# Patient Record
Sex: FEMALE | Race: OTHER | NOT HISPANIC OR LATINO | URBAN - METROPOLITAN AREA
[De-identification: names, ages, dates, MRNs, and addresses within clinical notes are randomized per-mention and may not be internally consistent; named-entity substitution may affect disease eponyms.]

---

## 2019-05-23 ENCOUNTER — EMERGENCY (EMERGENCY)
Facility: HOSPITAL | Age: 44
LOS: 1 days | Discharge: HOME | End: 2019-05-23
Attending: EMERGENCY MEDICINE
Payer: COMMERCIAL

## 2019-05-23 VITALS
RESPIRATION RATE: 18 BRPM | OXYGEN SATURATION: 99 % | HEART RATE: 88 BPM | SYSTOLIC BLOOD PRESSURE: 122 MMHG | TEMPERATURE: 98 F

## 2019-05-23 VITALS
WEIGHT: 128.97 LBS | OXYGEN SATURATION: 100 % | DIASTOLIC BLOOD PRESSURE: 79 MMHG | HEART RATE: 95 BPM | TEMPERATURE: 98 F | RESPIRATION RATE: 18 BRPM | HEIGHT: 66 IN | SYSTOLIC BLOOD PRESSURE: 124 MMHG

## 2019-05-23 LAB
ALBUMIN SERPL ELPH-MCNC: 4.3 G/DL — SIGNIFICANT CHANGE UP (ref 3.3–5)
ALP SERPL-CCNC: 45 U/L — SIGNIFICANT CHANGE UP (ref 40–120)
ALT FLD-CCNC: 12 U/L — SIGNIFICANT CHANGE UP (ref 10–45)
ANION GAP SERPL CALC-SCNC: 12 MMOL/L — SIGNIFICANT CHANGE UP (ref 5–17)
APPEARANCE UR: CLEAR — SIGNIFICANT CHANGE UP
AST SERPL-CCNC: 12 U/L — SIGNIFICANT CHANGE UP (ref 10–40)
BACTERIA # UR AUTO: NEGATIVE — SIGNIFICANT CHANGE UP
BASOPHILS # BLD AUTO: 0.1 K/UL — SIGNIFICANT CHANGE UP (ref 0–0.2)
BASOPHILS NFR BLD AUTO: 1.6 % — SIGNIFICANT CHANGE UP (ref 0–2)
BILIRUB SERPL-MCNC: 0.6 MG/DL — SIGNIFICANT CHANGE UP (ref 0.2–1.2)
BILIRUB UR-MCNC: NEGATIVE — SIGNIFICANT CHANGE UP
BUN SERPL-MCNC: 6 MG/DL — LOW (ref 7–23)
CALCIUM SERPL-MCNC: 9.8 MG/DL — SIGNIFICANT CHANGE UP (ref 8.4–10.5)
CHLORIDE SERPL-SCNC: 103 MMOL/L — SIGNIFICANT CHANGE UP (ref 96–108)
CO2 SERPL-SCNC: 26 MMOL/L — SIGNIFICANT CHANGE UP (ref 22–31)
COLOR SPEC: COLORLESS — SIGNIFICANT CHANGE UP
CREAT SERPL-MCNC: 0.57 MG/DL — SIGNIFICANT CHANGE UP (ref 0.5–1.3)
DIFF PNL FLD: NEGATIVE — SIGNIFICANT CHANGE UP
EOSINOPHIL # BLD AUTO: 0.1 K/UL — SIGNIFICANT CHANGE UP (ref 0–0.5)
EOSINOPHIL NFR BLD AUTO: 1.4 % — SIGNIFICANT CHANGE UP (ref 0–6)
EPI CELLS # UR: 0 /HPF — SIGNIFICANT CHANGE UP
GLUCOSE SERPL-MCNC: 116 MG/DL — HIGH (ref 70–99)
GLUCOSE UR QL: NEGATIVE — SIGNIFICANT CHANGE UP
HCG UR QL: NEGATIVE — SIGNIFICANT CHANGE UP
HCT VFR BLD CALC: 37.6 % — SIGNIFICANT CHANGE UP (ref 34.5–45)
HGB BLD-MCNC: 12.7 G/DL — SIGNIFICANT CHANGE UP (ref 11.5–15.5)
HYALINE CASTS # UR AUTO: 0 /LPF — SIGNIFICANT CHANGE UP (ref 0–2)
KETONES UR-MCNC: SIGNIFICANT CHANGE UP
LEUKOCYTE ESTERASE UR-ACNC: NEGATIVE — SIGNIFICANT CHANGE UP
LIDOCAIN IGE QN: 31 U/L — SIGNIFICANT CHANGE UP (ref 7–60)
LYMPHOCYTES # BLD AUTO: 2.2 K/UL — SIGNIFICANT CHANGE UP (ref 1–3.3)
LYMPHOCYTES # BLD AUTO: 41.8 % — SIGNIFICANT CHANGE UP (ref 13–44)
MCHC RBC-ENTMCNC: 33.7 GM/DL — SIGNIFICANT CHANGE UP (ref 32–36)
MCHC RBC-ENTMCNC: 33.9 PG — SIGNIFICANT CHANGE UP (ref 27–34)
MCV RBC AUTO: 101 FL — HIGH (ref 80–100)
MONOCYTES # BLD AUTO: 0.4 K/UL — SIGNIFICANT CHANGE UP (ref 0–0.9)
MONOCYTES NFR BLD AUTO: 7 % — SIGNIFICANT CHANGE UP (ref 2–14)
NEUTROPHILS # BLD AUTO: 2.5 K/UL — SIGNIFICANT CHANGE UP (ref 1.8–7.4)
NEUTROPHILS NFR BLD AUTO: 48.2 % — SIGNIFICANT CHANGE UP (ref 43–77)
NITRITE UR-MCNC: NEGATIVE — SIGNIFICANT CHANGE UP
PH UR: 7.5 — SIGNIFICANT CHANGE UP (ref 5–8)
PLATELET # BLD AUTO: 235 K/UL — SIGNIFICANT CHANGE UP (ref 150–400)
POTASSIUM SERPL-MCNC: 4.2 MMOL/L — SIGNIFICANT CHANGE UP (ref 3.5–5.3)
POTASSIUM SERPL-SCNC: 4.2 MMOL/L — SIGNIFICANT CHANGE UP (ref 3.5–5.3)
PROT SERPL-MCNC: 6.9 G/DL — SIGNIFICANT CHANGE UP (ref 6–8.3)
PROT UR-MCNC: NEGATIVE — SIGNIFICANT CHANGE UP
RBC # BLD: 3.74 M/UL — LOW (ref 3.8–5.2)
RBC # FLD: 11.7 % — SIGNIFICANT CHANGE UP (ref 10.3–14.5)
RBC CASTS # UR COMP ASSIST: 0 /HPF — SIGNIFICANT CHANGE UP (ref 0–4)
SODIUM SERPL-SCNC: 141 MMOL/L — SIGNIFICANT CHANGE UP (ref 135–145)
SP GR SPEC: 1 — LOW (ref 1.01–1.02)
UROBILINOGEN FLD QL: NEGATIVE — SIGNIFICANT CHANGE UP
WBC # BLD: 5.2 K/UL — SIGNIFICANT CHANGE UP (ref 3.8–10.5)
WBC # FLD AUTO: 5.2 K/UL — SIGNIFICANT CHANGE UP (ref 3.8–10.5)
WBC UR QL: 0 /HPF — SIGNIFICANT CHANGE UP (ref 0–5)

## 2019-05-23 PROCEDURE — 80053 COMPREHEN METABOLIC PANEL: CPT

## 2019-05-23 PROCEDURE — 74177 CT ABD & PELVIS W/CONTRAST: CPT | Mod: 26

## 2019-05-23 PROCEDURE — 96361 HYDRATE IV INFUSION ADD-ON: CPT

## 2019-05-23 PROCEDURE — 96375 TX/PRO/DX INJ NEW DRUG ADDON: CPT

## 2019-05-23 PROCEDURE — 74177 CT ABD & PELVIS W/CONTRAST: CPT

## 2019-05-23 PROCEDURE — 96374 THER/PROPH/DIAG INJ IV PUSH: CPT | Mod: XU

## 2019-05-23 PROCEDURE — 99284 EMERGENCY DEPT VISIT MOD MDM: CPT | Mod: 25

## 2019-05-23 PROCEDURE — 99284 EMERGENCY DEPT VISIT MOD MDM: CPT

## 2019-05-23 PROCEDURE — 81025 URINE PREGNANCY TEST: CPT

## 2019-05-23 PROCEDURE — 83690 ASSAY OF LIPASE: CPT

## 2019-05-23 PROCEDURE — 81001 URINALYSIS AUTO W/SCOPE: CPT

## 2019-05-23 PROCEDURE — 85027 COMPLETE CBC AUTOMATED: CPT

## 2019-05-23 RX ORDER — KETOROLAC TROMETHAMINE 30 MG/ML
15 SYRINGE (ML) INJECTION ONCE
Refills: 0 | Status: DISCONTINUED | OUTPATIENT
Start: 2019-05-23 | End: 2019-05-23

## 2019-05-23 RX ORDER — FAMOTIDINE 10 MG/ML
20 INJECTION INTRAVENOUS ONCE
Refills: 0 | Status: COMPLETED | OUTPATIENT
Start: 2019-05-23 | End: 2019-05-23

## 2019-05-23 RX ORDER — ACETAMINOPHEN 500 MG
975 TABLET ORAL ONCE
Refills: 0 | Status: COMPLETED | OUTPATIENT
Start: 2019-05-23 | End: 2019-05-23

## 2019-05-23 RX ORDER — ONDANSETRON 8 MG/1
4 TABLET, FILM COATED ORAL ONCE
Refills: 0 | Status: COMPLETED | OUTPATIENT
Start: 2019-05-23 | End: 2019-05-23

## 2019-05-23 RX ORDER — SODIUM CHLORIDE 9 MG/ML
1000 INJECTION INTRAMUSCULAR; INTRAVENOUS; SUBCUTANEOUS ONCE
Refills: 0 | Status: COMPLETED | OUTPATIENT
Start: 2019-05-23 | End: 2019-05-23

## 2019-05-23 RX ADMIN — FAMOTIDINE 20 MILLIGRAM(S): 10 INJECTION INTRAVENOUS at 20:21

## 2019-05-23 RX ADMIN — Medication 15 MILLIGRAM(S): at 20:23

## 2019-05-23 RX ADMIN — ONDANSETRON 4 MILLIGRAM(S): 8 TABLET, FILM COATED ORAL at 18:34

## 2019-05-23 RX ADMIN — Medication 975 MILLIGRAM(S): at 20:23

## 2019-05-23 RX ADMIN — SODIUM CHLORIDE 1000 MILLILITER(S): 9 INJECTION INTRAMUSCULAR; INTRAVENOUS; SUBCUTANEOUS at 18:30

## 2019-05-23 RX ADMIN — Medication 15 MILLIGRAM(S): at 18:34

## 2019-05-23 RX ADMIN — SODIUM CHLORIDE 1000 MILLILITER(S): 9 INJECTION INTRAMUSCULAR; INTRAVENOUS; SUBCUTANEOUS at 20:23

## 2019-05-23 RX ADMIN — Medication 975 MILLIGRAM(S): at 18:32

## 2019-05-23 NOTE — ED PROVIDER NOTE - CLINICAL SUMMARY MEDICAL DECISION MAKING FREE TEXT BOX
Maury WILLETT MD PGY1: 43 F p/w persistent generalized abd pain x 1 week with diffuse TTP c/f pancreatitis vs appendicitis vs other causes of acute abdomen. Will obtain CTAP to evaluate. WIll symptomatically treat for now.

## 2019-05-23 NOTE — ED PROVIDER NOTE - PHYSICAL EXAMINATION
Maury WILLETT MD PGY1:   PHYSICAL EXAM:    GENERAL: Uncomfortable  HEENT:  Atraumatic, Normocephalic  CHEST/LUNG: Chest rise equal bilaterally  HEART: Regular rate and rhythm  ABDOMEN: Diffusely TTP maximally LUQ  EXTREMITIES:  2+ Peripheral Pulses.  PSYCH: A&Ox3  SKIN: No obvious rashes or lesions Maury WILLETT MD PGY1:   PHYSICAL EXAM:    GENERAL: Uncomfortable  HEENT:  Atraumatic, Normocephalic  CHEST/LUNG: Chest rise equal bilaterally  HEART: Regular rate and rhythm  ABDOMEN: Diffusely TTP maximally LUQ  : No vaginal discharge or adnexal tenderness.   EXTREMITIES:  2+ Peripheral Pulses.  PSYCH: A&Ox3  SKIN: No obvious rashes or lesions

## 2019-05-23 NOTE — ED PROVIDER NOTE - NSFOLLOWUPCLINICS_GEN_ALL_ED_FT
[facility]  Gastroenterology  [address] [place] [address] [address]  [place] [address]  Phone: [phone]  Fax:   Follow Up Time:     [facility]  Gastroenterology  [address] [place] [address]  Phone: [phone]  Fax: [phone]  Follow Up Time:

## 2019-05-23 NOTE — ED ADULT NURSE NOTE - NSIMPLEMENTINTERV_GEN_ALL_ED
Implemented All Universal Safety Interventions:  [place] to call system. Call bell, personal items and telephone within reach. Instruct patient to call for assistance. Room bathroom lighting operational. Non-slip footwear when patient is off stretcher. Physically safe environment: no spills, clutter or unnecessary equipment. Stretcher in lowest position, wheels locked, appropriate side rails in place.

## 2019-05-23 NOTE — ED PROVIDER NOTE - OBJECTIVE STATEMENT
Maury WILLETT MD PGY1: 43 F PMH GERD now resolved and chronic candidiasis p/w generalized abdominal pain worsening x 1 week originating from LUQ initially associated with poor PO intake, nausea, no emesis, and normal BM. Only abd surg was C section but passing regular BM and flatus. No fevers, subjecive chills. No recent sickness. No other assoc sxs.

## 2019-05-23 NOTE — ED PROVIDER NOTE - PROGRESS NOTE DETAILS
Maury WILLETT MD PGY1: Patient's sxs resolved. Ate a full meal and tolerated it for > 1 hour now. No abdominal pain on abdominal exam. Patient encouragedd to follow-up with a gastroenterologist and return precautions given.

## 2019-05-23 NOTE — ED PROVIDER NOTE - NSFOLLOWUPINSTRUCTIONS_ED_ALL_ED_FT
Please return to the ED for any concerns. Please follow-up with a gastroenterologist in the next week.

## 2019-05-23 NOTE — ED ADULT NURSE NOTE - OBJECTIVE STATEMENT
44 y/o female came with   with history of chronic candidiasis, pt c/o generalized abdominal pain worsening x 1 week that started from LUQ.  Pt also reports poor PO intake, nausea, normal BM but no vomiting.  No fevers,  no chills, no recent sickness, no chest pain.  Respiration easy and non labored.  Extremities mobile.

## 2019-05-25 ENCOUNTER — EMERGENCY (EMERGENCY)
Facility: HOSPITAL | Age: 44
LOS: 1 days | Discharge: HOME | End: 2019-05-25
Attending: STUDENT IN AN ORGANIZED HEALTH CARE EDUCATION/TRAINING PROGRAM
Payer: COMMERCIAL

## 2019-05-25 VITALS
DIASTOLIC BLOOD PRESSURE: 83 MMHG | WEIGHT: 128.09 LBS | HEART RATE: 76 BPM | SYSTOLIC BLOOD PRESSURE: 123 MMHG | HEIGHT: 66 IN | RESPIRATION RATE: 16 BRPM | TEMPERATURE: 98 F | OXYGEN SATURATION: 99 %

## 2019-05-25 PROCEDURE — 99284 EMERGENCY DEPT VISIT MOD MDM: CPT

## 2019-05-26 VITALS
TEMPERATURE: 98 F | OXYGEN SATURATION: 97 % | RESPIRATION RATE: 16 BRPM | DIASTOLIC BLOOD PRESSURE: 85 MMHG | SYSTOLIC BLOOD PRESSURE: 125 MMHG | HEART RATE: 65 BPM

## 2019-05-26 PROBLEM — K21.9 GASTRO-ESOPHAGEAL REFLUX DISEASE WITHOUT ESOPHAGITIS: Chronic | Status: ACTIVE | Noted: 2019-05-23

## 2019-05-26 LAB
ALBUMIN SERPL ELPH-MCNC: 4.4 G/DL — SIGNIFICANT CHANGE UP (ref 3.3–5)
ALP SERPL-CCNC: 41 U/L — SIGNIFICANT CHANGE UP (ref 40–120)
ALT FLD-CCNC: 13 U/L — SIGNIFICANT CHANGE UP (ref 10–45)
ANION GAP SERPL CALC-SCNC: 13 MMOL/L — SIGNIFICANT CHANGE UP (ref 5–17)
APPEARANCE UR: CLEAR — SIGNIFICANT CHANGE UP
AST SERPL-CCNC: 14 U/L — SIGNIFICANT CHANGE UP (ref 10–40)
BACTERIA # UR AUTO: NEGATIVE — SIGNIFICANT CHANGE UP
BASOPHILS # BLD AUTO: 0.1 K/UL — SIGNIFICANT CHANGE UP (ref 0–0.2)
BASOPHILS NFR BLD AUTO: 0.9 % — SIGNIFICANT CHANGE UP (ref 0–2)
BILIRUB SERPL-MCNC: 0.8 MG/DL — SIGNIFICANT CHANGE UP (ref 0.2–1.2)
BILIRUB UR-MCNC: NEGATIVE — SIGNIFICANT CHANGE UP
BUN SERPL-MCNC: 4 MG/DL — LOW (ref 7–23)
CALCIUM SERPL-MCNC: 9.6 MG/DL — SIGNIFICANT CHANGE UP (ref 8.4–10.5)
CHLORIDE SERPL-SCNC: 98 MMOL/L — SIGNIFICANT CHANGE UP (ref 96–108)
CO2 SERPL-SCNC: 25 MMOL/L — SIGNIFICANT CHANGE UP (ref 22–31)
COLOR SPEC: YELLOW — SIGNIFICANT CHANGE UP
CREAT SERPL-MCNC: 0.55 MG/DL — SIGNIFICANT CHANGE UP (ref 0.5–1.3)
DIFF PNL FLD: NEGATIVE — SIGNIFICANT CHANGE UP
EOSINOPHIL # BLD AUTO: 0.2 K/UL — SIGNIFICANT CHANGE UP (ref 0–0.5)
EOSINOPHIL NFR BLD AUTO: 2.6 % — SIGNIFICANT CHANGE UP (ref 0–6)
EPI CELLS # UR: 0 /HPF — SIGNIFICANT CHANGE UP
GLUCOSE SERPL-MCNC: 102 MG/DL — HIGH (ref 70–99)
GLUCOSE UR QL: NEGATIVE — SIGNIFICANT CHANGE UP
HCT VFR BLD CALC: 35.4 % — SIGNIFICANT CHANGE UP (ref 34.5–45)
HGB BLD-MCNC: 11.9 G/DL — SIGNIFICANT CHANGE UP (ref 11.5–15.5)
HYALINE CASTS # UR AUTO: 0 /LPF — SIGNIFICANT CHANGE UP (ref 0–2)
KETONES UR-MCNC: NEGATIVE — SIGNIFICANT CHANGE UP
LEUKOCYTE ESTERASE UR-ACNC: NEGATIVE — SIGNIFICANT CHANGE UP
LIDOCAIN IGE QN: 36 U/L — SIGNIFICANT CHANGE UP (ref 7–60)
LYMPHOCYTES # BLD AUTO: 2.8 K/UL — SIGNIFICANT CHANGE UP (ref 1–3.3)
LYMPHOCYTES # BLD AUTO: 47.5 % — HIGH (ref 13–44)
MCHC RBC-ENTMCNC: 33.7 GM/DL — SIGNIFICANT CHANGE UP (ref 32–36)
MCHC RBC-ENTMCNC: 34 PG — SIGNIFICANT CHANGE UP (ref 27–34)
MCV RBC AUTO: 101 FL — HIGH (ref 80–100)
MONOCYTES # BLD AUTO: 0.4 K/UL — SIGNIFICANT CHANGE UP (ref 0–0.9)
MONOCYTES NFR BLD AUTO: 7 % — SIGNIFICANT CHANGE UP (ref 2–14)
NEUTROPHILS # BLD AUTO: 2.5 K/UL — SIGNIFICANT CHANGE UP (ref 1.8–7.4)
NEUTROPHILS NFR BLD AUTO: 42 % — LOW (ref 43–77)
NITRITE UR-MCNC: NEGATIVE — SIGNIFICANT CHANGE UP
PH UR: 6.5 — SIGNIFICANT CHANGE UP (ref 5–8)
PLATELET # BLD AUTO: 213 K/UL — SIGNIFICANT CHANGE UP (ref 150–400)
POTASSIUM SERPL-MCNC: 3.9 MMOL/L — SIGNIFICANT CHANGE UP (ref 3.5–5.3)
POTASSIUM SERPL-SCNC: 3.9 MMOL/L — SIGNIFICANT CHANGE UP (ref 3.5–5.3)
PROT SERPL-MCNC: 6.8 G/DL — SIGNIFICANT CHANGE UP (ref 6–8.3)
PROT UR-MCNC: NEGATIVE — SIGNIFICANT CHANGE UP
RBC # BLD: 3.5 M/UL — LOW (ref 3.8–5.2)
RBC # FLD: 11.6 % — SIGNIFICANT CHANGE UP (ref 10.3–14.5)
RBC CASTS # UR COMP ASSIST: 1 /HPF — SIGNIFICANT CHANGE UP (ref 0–4)
SODIUM SERPL-SCNC: 136 MMOL/L — SIGNIFICANT CHANGE UP (ref 135–145)
SP GR SPEC: 1 — LOW (ref 1.01–1.02)
UROBILINOGEN FLD QL: NEGATIVE — SIGNIFICANT CHANGE UP
WBC # BLD: 5.9 K/UL — SIGNIFICANT CHANGE UP (ref 3.8–10.5)
WBC # FLD AUTO: 5.9 K/UL — SIGNIFICANT CHANGE UP (ref 3.8–10.5)
WBC UR QL: 0 /HPF — SIGNIFICANT CHANGE UP (ref 0–5)

## 2019-05-26 PROCEDURE — 83690 ASSAY OF LIPASE: CPT

## 2019-05-26 PROCEDURE — 87086 URINE CULTURE/COLONY COUNT: CPT

## 2019-05-26 PROCEDURE — 80053 COMPREHEN METABOLIC PANEL: CPT

## 2019-05-26 PROCEDURE — 96374 THER/PROPH/DIAG INJ IV PUSH: CPT

## 2019-05-26 PROCEDURE — 99284 EMERGENCY DEPT VISIT MOD MDM: CPT | Mod: 25

## 2019-05-26 PROCEDURE — 85027 COMPLETE CBC AUTOMATED: CPT

## 2019-05-26 PROCEDURE — 81001 URINALYSIS AUTO W/SCOPE: CPT

## 2019-05-26 RX ORDER — FAMOTIDINE 10 MG/ML
20 INJECTION INTRAVENOUS ONCE
Refills: 0 | Status: COMPLETED | OUTPATIENT
Start: 2019-05-26 | End: 2019-05-26

## 2019-05-26 RX ORDER — PHENAZOPYRIDINE HCL 100 MG
200 TABLET ORAL ONCE
Refills: 0 | Status: DISCONTINUED | OUTPATIENT
Start: 2019-05-26 | End: 2019-05-29

## 2019-05-26 RX ADMIN — FAMOTIDINE 20 MILLIGRAM(S): 10 INJECTION INTRAVENOUS at 01:26

## 2019-05-26 RX ADMIN — Medication 30 MILLILITER(S): at 01:26

## 2019-05-26 NOTE — ED PROVIDER NOTE - ATTENDING CONTRIBUTION TO CARE
suprapubic abd pain and dysuria. no vag bleeding/discharge  mild imoprovement with home squeezed cranberry juice  no fever chills, cp, sob n/v back pain rash  lmp 2 weeks ago    nad ncat mmm  s1s2 rrr  lungs cta  abd soft ntnd  mild suprapubic ttp    labs without significant abnormality suprapubic abd pain and dysuria. no vag bleeding/discharge  mild imoprovement with home squeezed cranberry juice  no fever chills, cp, sob n/v back pain rash  lmp 2 weeks ago  seen by gyn recently and reports normal exam    nad ncat mmm  s1s2 rrr  lungs cta  abd soft ntnd  mild suprapubic ttp  pt deferring gu exam  labs without significant abnormality. no uti  doubt surgical pathology  will f/u with pcp tues  pyridium prn 44 yo F pmh gerd with suprapubic abd pain and dysuria. no vag bleeding/discharge  mild improvement with home squeezed cranberry juice  no fever chills, cp, sob n/v back pain rash  lmp 2 weeks ago  seen by gyn recently and reports normal exam. labs/ct from recent visit reviewed.    nad ncat mmm  s1s2 rrr  lungs cta  abd soft ntnd, no rebound or guarding  mild suprapubic ttp, no cva ttp  pt declining/deferring gu exam in ED  labs without significant abnormality. no uti  doubt surgical pathology  will f/u with pcp tuesday  will dc with pyridium prn  Return precautions were discussed with patient at bedside and patient expressed understanding.

## 2019-05-26 NOTE — ED PROVIDER NOTE - PHYSICAL EXAMINATION
Maury WILLETT MD PGY1:   PHYSICAL EXAM:    GENERAL: NAD, well-developed  HEENT:  Atraumatic, Normocephalic  CHEST/LUNG: Chest rise equal bilaterally  HEART: Regular rate and rhythm  ABDOMEN: Soft, Nontender, Nondistended   : Patient deferring vaginal exam for now as she is "99% sure" that it isnt involved.   EXTREMITIES:  2+ Peripheral Pulses.  PSYCH: A&Ox3  SKIN: No obvious rashes or lesions

## 2019-05-26 NOTE — ED PROVIDER NOTE - NSFOLLOWUPCLINICS_GEN_ALL_ED_FT
[facility] - Primary Care  Primary Care  [address] [place] [address]  Phone: [phone]  Fax:   Follow Up Time:

## 2019-05-26 NOTE — ED PROVIDER NOTE - CLINICAL SUMMARY MEDICAL DECISION MAKING FREE TEXT BOX
Maury WILLETT MD PGY1: 43 F no cont PMH p/w dysuria and suprapubic pressure x 2 days with negative UA and deferring vaginal exam. Joint decision making with patient. Patient comfortable following up with her OBGYN on Tuesday with premade appointment and return to the ED if febrile, chills, any concerns.

## 2019-05-26 NOTE — ED ADULT NURSE NOTE - OBJECTIVE STATEMENT
44y/o female presented to the ED from home with complaint of abdominal pain. A&Ox3, ambulatory. Patient reports lower abdominal pain x2 days associated with dysuria and suprapubic pressure. Patient states that she was seen here 2 days ago for similar symptoms. Patient states that she has been taking cranberry supplements and drinking cranberry juice an dit has been helping. Patient states that she has an apt with OBGYN on Tuesday. Denies chest pain, N/V/D, headache. Patient tolerating PO intake. Patient resting comfortably In bed, VSS, no acute distress noted at this time.

## 2019-05-26 NOTE — ED PROVIDER NOTE - OBJECTIVE STATEMENT
Maury WILLETT MD PGY1: 43 F recently seen in the ED for generalized abodminal pain with history or recurrent vaginal candidiasis here for dysuria and suprpubic pressure x 2 days without back pain, fevers, hematuria, vaginal discharge, difficulty passing BM. No exacerbating or alleviating factors. TOlerating PO. Has appt with OBGYN on Tuesday.

## 2019-05-27 LAB
CULTURE RESULTS: SIGNIFICANT CHANGE UP
SPECIMEN SOURCE: SIGNIFICANT CHANGE UP

## 2019-09-07 ENCOUNTER — EMERGENCY (EMERGENCY)
Facility: HOSPITAL | Age: 44
LOS: 1 days | Discharge: HOME | End: 2019-09-07
Attending: EMERGENCY MEDICINE
Payer: COMMERCIAL

## 2019-09-07 VITALS
TEMPERATURE: 98 F | DIASTOLIC BLOOD PRESSURE: 76 MMHG | RESPIRATION RATE: 17 BRPM | OXYGEN SATURATION: 100 % | HEART RATE: 66 BPM | SYSTOLIC BLOOD PRESSURE: 114 MMHG

## 2019-09-07 VITALS
SYSTOLIC BLOOD PRESSURE: 123 MMHG | OXYGEN SATURATION: 95 % | RESPIRATION RATE: 16 BRPM | DIASTOLIC BLOOD PRESSURE: 77 MMHG | TEMPERATURE: 98 F | HEART RATE: 84 BPM | WEIGHT: 121.92 LBS | HEIGHT: 66 IN

## 2019-09-07 DIAGNOSIS — Z98.891 HISTORY OF UTERINE SCAR FROM PREVIOUS SURGERY: Chronic | ICD-10-CM

## 2019-09-07 LAB
ALBUMIN SERPL ELPH-MCNC: 4.6 G/DL — SIGNIFICANT CHANGE UP (ref 3.3–5)
ALP SERPL-CCNC: 54 U/L — SIGNIFICANT CHANGE UP (ref 40–120)
ALT FLD-CCNC: 14 U/L — SIGNIFICANT CHANGE UP (ref 10–45)
ANION GAP SERPL CALC-SCNC: 13 MMOL/L — SIGNIFICANT CHANGE UP (ref 5–17)
APPEARANCE UR: CLEAR — SIGNIFICANT CHANGE UP
AST SERPL-CCNC: 12 U/L — SIGNIFICANT CHANGE UP (ref 10–40)
BASOPHILS # BLD AUTO: 0.1 K/UL — SIGNIFICANT CHANGE UP (ref 0–0.2)
BASOPHILS NFR BLD AUTO: 1 % — SIGNIFICANT CHANGE UP (ref 0–2)
BILIRUB SERPL-MCNC: 0.5 MG/DL — SIGNIFICANT CHANGE UP (ref 0.2–1.2)
BILIRUB UR-MCNC: NEGATIVE — SIGNIFICANT CHANGE UP
BUN SERPL-MCNC: 6 MG/DL — LOW (ref 7–23)
CALCIUM SERPL-MCNC: 9.8 MG/DL — SIGNIFICANT CHANGE UP (ref 8.4–10.5)
CHLORIDE SERPL-SCNC: 102 MMOL/L — SIGNIFICANT CHANGE UP (ref 96–108)
CO2 SERPL-SCNC: 26 MMOL/L — SIGNIFICANT CHANGE UP (ref 22–31)
COLOR SPEC: COLORLESS — SIGNIFICANT CHANGE UP
CREAT SERPL-MCNC: 0.6 MG/DL — SIGNIFICANT CHANGE UP (ref 0.5–1.3)
DIFF PNL FLD: NEGATIVE — SIGNIFICANT CHANGE UP
EOSINOPHIL # BLD AUTO: 0 K/UL — SIGNIFICANT CHANGE UP (ref 0–0.5)
EOSINOPHIL NFR BLD AUTO: 0.2 % — SIGNIFICANT CHANGE UP (ref 0–6)
GAS PNL BLDV: SIGNIFICANT CHANGE UP
GLUCOSE SERPL-MCNC: 155 MG/DL — HIGH (ref 70–99)
GLUCOSE UR QL: NEGATIVE — SIGNIFICANT CHANGE UP
HCG UR QL: NEGATIVE — SIGNIFICANT CHANGE UP
HCT VFR BLD CALC: 38.8 % — SIGNIFICANT CHANGE UP (ref 34.5–45)
HGB BLD-MCNC: 12.5 G/DL — SIGNIFICANT CHANGE UP (ref 11.5–15.5)
KETONES UR-MCNC: NEGATIVE — SIGNIFICANT CHANGE UP
LEUKOCYTE ESTERASE UR-ACNC: NEGATIVE — SIGNIFICANT CHANGE UP
LIDOCAIN IGE QN: 24 U/L — SIGNIFICANT CHANGE UP (ref 7–60)
LYMPHOCYTES # BLD AUTO: 1.9 K/UL — SIGNIFICANT CHANGE UP (ref 1–3.3)
LYMPHOCYTES # BLD AUTO: 35.4 % — SIGNIFICANT CHANGE UP (ref 13–44)
MCHC RBC-ENTMCNC: 32.3 GM/DL — SIGNIFICANT CHANGE UP (ref 32–36)
MCHC RBC-ENTMCNC: 33 PG — SIGNIFICANT CHANGE UP (ref 27–34)
MCV RBC AUTO: 102 FL — HIGH (ref 80–100)
MONOCYTES # BLD AUTO: 0.2 K/UL — SIGNIFICANT CHANGE UP (ref 0–0.9)
MONOCYTES NFR BLD AUTO: 4.1 % — SIGNIFICANT CHANGE UP (ref 2–14)
NEUTROPHILS # BLD AUTO: 3.2 K/UL — SIGNIFICANT CHANGE UP (ref 1.8–7.4)
NEUTROPHILS NFR BLD AUTO: 59.3 % — SIGNIFICANT CHANGE UP (ref 43–77)
NITRITE UR-MCNC: NEGATIVE — SIGNIFICANT CHANGE UP
PH UR: 8 — SIGNIFICANT CHANGE UP (ref 5–8)
PLATELET # BLD AUTO: 211 K/UL — SIGNIFICANT CHANGE UP (ref 150–400)
POTASSIUM SERPL-MCNC: 4 MMOL/L — SIGNIFICANT CHANGE UP (ref 3.5–5.3)
POTASSIUM SERPL-SCNC: 4 MMOL/L — SIGNIFICANT CHANGE UP (ref 3.5–5.3)
PROT SERPL-MCNC: 7.1 G/DL — SIGNIFICANT CHANGE UP (ref 6–8.3)
PROT UR-MCNC: NEGATIVE — SIGNIFICANT CHANGE UP
RBC # BLD: 3.78 M/UL — LOW (ref 3.8–5.2)
RBC # FLD: 12 % — SIGNIFICANT CHANGE UP (ref 10.3–14.5)
SODIUM SERPL-SCNC: 141 MMOL/L — SIGNIFICANT CHANGE UP (ref 135–145)
SP GR SPEC: 1 — LOW (ref 1.01–1.02)
TROPONIN T, HIGH SENSITIVITY RESULT: <6 NG/L — SIGNIFICANT CHANGE UP (ref 0–51)
UROBILINOGEN FLD QL: NEGATIVE — SIGNIFICANT CHANGE UP
WBC # BLD: 5.4 K/UL — SIGNIFICANT CHANGE UP (ref 3.8–10.5)
WBC # FLD AUTO: 5.4 K/UL — SIGNIFICANT CHANGE UP (ref 3.8–10.5)

## 2019-09-07 PROCEDURE — 81025 URINE PREGNANCY TEST: CPT

## 2019-09-07 PROCEDURE — 81003 URINALYSIS AUTO W/O SCOPE: CPT

## 2019-09-07 PROCEDURE — 71046 X-RAY EXAM CHEST 2 VIEWS: CPT | Mod: 26

## 2019-09-07 PROCEDURE — 80053 COMPREHEN METABOLIC PANEL: CPT

## 2019-09-07 PROCEDURE — 99284 EMERGENCY DEPT VISIT MOD MDM: CPT | Mod: 25

## 2019-09-07 PROCEDURE — 84484 ASSAY OF TROPONIN QUANT: CPT

## 2019-09-07 PROCEDURE — 96375 TX/PRO/DX INJ NEW DRUG ADDON: CPT

## 2019-09-07 PROCEDURE — 84132 ASSAY OF SERUM POTASSIUM: CPT

## 2019-09-07 PROCEDURE — 71046 X-RAY EXAM CHEST 2 VIEWS: CPT

## 2019-09-07 PROCEDURE — 82947 ASSAY GLUCOSE BLOOD QUANT: CPT

## 2019-09-07 PROCEDURE — 85027 COMPLETE CBC AUTOMATED: CPT

## 2019-09-07 PROCEDURE — 83605 ASSAY OF LACTIC ACID: CPT

## 2019-09-07 PROCEDURE — 82330 ASSAY OF CALCIUM: CPT

## 2019-09-07 PROCEDURE — 84295 ASSAY OF SERUM SODIUM: CPT

## 2019-09-07 PROCEDURE — 96374 THER/PROPH/DIAG INJ IV PUSH: CPT

## 2019-09-07 PROCEDURE — 93005 ELECTROCARDIOGRAM TRACING: CPT

## 2019-09-07 PROCEDURE — 99285 EMERGENCY DEPT VISIT HI MDM: CPT

## 2019-09-07 PROCEDURE — 83690 ASSAY OF LIPASE: CPT

## 2019-09-07 PROCEDURE — 85014 HEMATOCRIT: CPT

## 2019-09-07 PROCEDURE — 82803 BLOOD GASES ANY COMBINATION: CPT

## 2019-09-07 PROCEDURE — 93010 ELECTROCARDIOGRAM REPORT: CPT

## 2019-09-07 PROCEDURE — 82435 ASSAY OF BLOOD CHLORIDE: CPT

## 2019-09-07 RX ORDER — FAMOTIDINE 10 MG/ML
20 INJECTION INTRAVENOUS ONCE
Refills: 0 | Status: COMPLETED | OUTPATIENT
Start: 2019-09-07 | End: 2019-09-07

## 2019-09-07 RX ORDER — SODIUM CHLORIDE 9 MG/ML
1000 INJECTION, SOLUTION INTRAVENOUS ONCE
Refills: 0 | Status: COMPLETED | OUTPATIENT
Start: 2019-09-07 | End: 2019-09-07

## 2019-09-07 RX ORDER — ONDANSETRON 8 MG/1
4 TABLET, FILM COATED ORAL ONCE
Refills: 0 | Status: COMPLETED | OUTPATIENT
Start: 2019-09-07 | End: 2019-09-07

## 2019-09-07 RX ADMIN — SODIUM CHLORIDE 1000 MILLILITER(S): 9 INJECTION, SOLUTION INTRAVENOUS at 12:41

## 2019-09-07 RX ADMIN — Medication 30 MILLILITER(S): at 12:39

## 2019-09-07 RX ADMIN — FAMOTIDINE 20 MILLIGRAM(S): 10 INJECTION INTRAVENOUS at 12:39

## 2019-09-07 RX ADMIN — ONDANSETRON 4 MILLIGRAM(S): 8 TABLET, FILM COATED ORAL at 12:39

## 2019-09-07 NOTE — ED PROVIDER NOTE - CLINICAL SUMMARY MEDICAL DECISION MAKING FREE TEXT BOX
44F w/ hx of one week of diarrhea and heart burn, non-radiating, non-exertional. Pt had similar symptoms in May when she was seen here. Had CT a/p that was neg at that time for intraabdominal pathology. The patient most likely has uncontrolled GERD. Will check cbc, cmp, lipase, trop. EKG NSR. Will attempt symptom control with pepcid, maalox, zofran. Will hold off on advanced imaging at this time. If symptosm controlled will dc with gi follow up.

## 2019-09-07 NOTE — ED ADULT NURSE NOTE - CHPI ED NUR SYMPTOMS NEG
no fever/no dysuria/no hematuria/no abdominal distension/no vomiting/no blood in stool/no burning urination

## 2019-09-07 NOTE — ED PROVIDER NOTE - NSFOLLOWUPCLINICS_GEN_ALL_ED_FT
[facility]  Gastroenterology  [address] [place] [address]  Phone: [phone]  Fax:   Follow Up Time: 1-3 Days

## 2019-09-07 NOTE — ED PROVIDER NOTE - PROGRESS NOTE DETAILS
Rito SALDANA: CBC and CMP reassuring. Troponin and lipase wnl. Pt feeling better. UA just sent. If normal will dc home with dx of recurrent GERD and give outpatient follow up with GI

## 2019-09-07 NOTE — ED PROVIDER NOTE - PHYSICAL EXAMINATION
General: WD, NAD, thin, sad appearing  HEENT: PERRLA, EOMI, moist mucous membranes  Neurology: A&Ox3, nonfocal, PENA x 4  Respiratory: CTA B/L, normal respiratory effort, no wheezes, crackles, rales  CV: RRR, S1S2, no murmurs, rubs or gallops  Abdominal: Soft, TTP over the epigastrum, ND +hyperactive BS  Extremities: No edema, + peripheral pulses  Incisions: well healed csection  Tubes: none

## 2019-09-07 NOTE — ED PROVIDER NOTE - OBJECTIVE STATEMENT
44F w/ hx of one week of diarrhea and heart burn. Has been taking baking soda, which improved the burning sensation. However now still feels globus sensation and some central chest pain. Also having difficulty sleeping and wakes up with anxiety. Endorses a 38 pound unintentional weight loss since November. Decreased appetite. Endorses increased burping and belching. Pt is tearful in the exam room. Patient states she is trying very hard to follow doctors instructions, but not feeling better. Has previously seen gastroenterologist who did endoscopy and dx with gerd. The pt states last 10 days has been the worst. Denies fevers, but endorses chills and night sweats for the last few days. No known sick contacts. No lower abd pain, but does have some rectal pain. The epigastric discomfort is unchanged by food. Wakes up with acid taste in mouth.    PMH: GERD  Meds: Supplements  Allerg: NKDA  Surg:  x1  PMD: Dr. Sharron Mcdermott in [place]

## 2019-09-07 NOTE — ED ADULT NURSE NOTE - OBJECTIVE STATEMENT
44F w/ hx of  GERD p/w one week of diarrhea , nausea and heart burn.  Report that she has been taking baking soda, which improved the burning sensation. pt report . Also having difficulty sleeping and wakes up with anxiety. Endorses a 38 pound unintentional weight loss since November. Decreased appetite. Endorses increased burping and belching.  Has previously seen gastroenterologist who did endoscopy and dx with gerd. The pt states last 10 days  that discomfort has gotten worse and she is experiencing difficulty swallowing due to burning sensation in throat. Denies fevers, but endorses chills and night sweats for the last few days. Denies sick contacts and recent travels.  The epigastric discomfort is unchanged by food. Wakes up with acid taste in mouth.

## 2019-09-07 NOTE — ED PROVIDER NOTE - ATTENDING CONTRIBUTION TO CARE
45 yo female, hx of prior severe GERD s/p endoscopy and PPI therapy 5 years ago with complete resolution of symptoms, now on no meds, p/w several months of weight loss, epigastric burning, uncomfortable sensation in her throat.  Well appearing, EKG unremarkable.  Will check labs, CXR, medicate and reassess, likely GI pathology and needs outpatient GI follow-up and to be back on PPI.

## 2019-09-07 NOTE — ED PROVIDER NOTE - NSFOLLOWUPINSTRUCTIONS_ED_ALL_ED_FT
1) You were seen in the ED with upper abdominal pain, burning, and sensation that something is stuck in your throat. These symptoms are most likely due to the return of your acid reflux disease.   2) You should starting taking omeprazole again as directed. It will take up to a week to start working so you should also take pepcid over the counter as directed for the next week. You should follow up with a gastroenterologist this week so that you can have a repeat endoscopy.  3) Please follow up with your PMD in the next 24-48hrs.  4) Please return to the ED if you have any new or concerning symptoms.

## 2019-09-07 NOTE — ED PROVIDER NOTE - CHIEF COMPLAINT
The patient is a 44y Female complaining of The patient is a 44y Female complaining of diff swallowing and chest pain

## 2019-09-07 NOTE — ED PROVIDER NOTE - PATIENT PORTAL LINK FT
You can access the FollowMyHealth Patient Portal offered by [facility] by registering at the following website: [email or web address]. By joining [facility]’s FollowMyHealth portal, you will also be able to view your health information using other applications (apps) compatible with our system.

## 2019-09-07 NOTE — ED PROVIDER NOTE - NS ED ROS FT
Gen: No fever, +chills, +dec appetite  Eyes: No eye irritation or discharge  ENT: No ear pain, congestion  Resp: No cough or trouble breathing  Cardiovascular: No palpitation  Gastroenteric: +nausea, but no vomiting, + Nonbloody diarrhea, but denies constipation  :  No change in urine output; no dysuria  MS: No joint or muscle pain  Skin: No rashes  Neuro: No headache; no abnormal movements  Remainder negative, except as per the HPI

## 2019-09-25 PROBLEM — Z00.00 ENCOUNTER FOR PREVENTIVE HEALTH EXAMINATION: Status: ACTIVE | Noted: 2019-09-25

## 2019-10-07 ENCOUNTER — APPOINTMENT (OUTPATIENT)
Dept: INFECTIOUS DISEASE | Facility: CLINIC | Age: 44
End: 2019-10-07

## 2019-10-08 ENCOUNTER — APPOINTMENT (OUTPATIENT)
Dept: INFECTIOUS DISEASE | Facility: CLINIC | Age: 44
End: 2019-10-08
Payer: COMMERCIAL

## 2019-10-08 VITALS
HEART RATE: 78 BPM | OXYGEN SATURATION: 99 % | HEIGHT: 66 IN | DIASTOLIC BLOOD PRESSURE: 71 MMHG | SYSTOLIC BLOOD PRESSURE: 105 MMHG | TEMPERATURE: 97.6 F

## 2019-10-08 DIAGNOSIS — Z56.0 UNEMPLOYMENT, UNSPECIFIED: ICD-10-CM

## 2019-10-08 PROCEDURE — 99203 OFFICE O/P NEW LOW 30 MIN: CPT

## 2019-10-08 SDOH — ECONOMIC STABILITY - INCOME SECURITY: UNEMPLOYMENT, UNSPECIFIED: Z56.0

## 2019-10-08 NOTE — ASSESSMENT
[FreeTextEntry1] : This is a pleasant 43 yo F who presents today for recurrent candida infections. Seems to have recurrent vaginal infections. No active infection now.\par \par It seems she is concerned about having candida overgrowth. She has [facility] her PCP, GYN, [facility] One doctor did a candida IgG which was found to be positive.\par \par I explained to the patient that this test indicates she has been exposed to candida and may have had an infection in the past.  \par \par At this point in time, I do not see an obvious active infection that needs to be treated.  \par \par I am concerned about the 40 lb weight loss and she does report some GI symptoms. I advised she see gastro and return to her PCP. She reports she has an appointment with GI in the next few weeks.\par \par I advised her that if she continues to get yeast infections, I can talk to her PCP and GYN to try and obtain cultures.\par \par She can return if needed. \par \par I called her PCP, Dr. Desiree Mckee and left a message requesting a call back.\par \par

## 2019-10-08 NOTE — DATA REVIEWED
[FreeTextEntry1] : 6/10/2019\par Candida IgG positive\par Candida IgA negative\par Candida IgM negative

## 2019-10-08 NOTE — HISTORY OF PRESENT ILLNESS
[FreeTextEntry1] : This is a 45 yo F with no pmh who presents today for recurrent candida infections.\par \par Pt reports that years ago she had a vaginal yeast infection which resolved with topical antifungal.\par About 1 year ago around Thanksgiving 2018 she developed another vaginal yeast infection. She used topical antifungal cream and changed her diet and infection resolved.\par \par Since then she feels the infections have recurred.  She ended up seeing her GYN. Treated with diflucan x 3 days and topical antifungal.  \par Despite this she felt the infections returned.\par She thinks eating sugary food would cause it to return.  \par \par She is concerned she has candida in the gut b/c she has intermittent diarrhea and constipation. She has acid reflux which acts up at times as well.  She feels she has a digestion problem from this.  \par \par In May went to holistic doctor and he gave her antibiotic drops x 3 months. \par She felt a little better but when she had stress or GERD, symptoms would return. \par \par Went to another doctor 8/2019 and given fluconazole again and IV therapy with B12.  Nystatin 1 tablet x 3 months. Unable to tolerate nystatin and after the 2nd day she discontinued it.    Felt odd on it.  Felt like head was swollen and foggy.  Then she stopped it.  \par \par Then went back to PCP and was sent to me, ID.\par She has since started taking supplements for the past 3 weeks and she feels a little better.\par She reports she lost 40 lbs in the past year b/c she has been avoiding sugar, carbs, and dairy. \par \par Holistic doctor did blood test for candida and found to have IgG  to candida in the dried blood. \par \par No vaginal discharge now.\par When she has stress she will get rectal and vaginal itching.\par \par

## 2019-10-08 NOTE — REVIEW OF SYSTEMS
[Chills] : chills [Recent Weight Loss (___ Lbs)] : recent [unfilled] ~Ulb weight loss [Anxiety] : anxiety [Negative] : Neurological [Fever] : no fever [Abdominal Pain] : no abdominal pain [Vomiting] : no vomiting [Diarrhea] : no diarrhea [Vaginal Discharge] : no vaginal discharge [Dysuria] : no dysuria [Suicidal] : not suicidal [FreeTextEntry2] : sweats at night [Depression] : no depression [de-identified] : worried

## 2019-10-08 NOTE — CONSULT LETTER
[Dear  ___] : Dear  [unfilled], [Consult Letter:] : I had the pleasure of evaluating your patient, [unfilled]. [Please see my note below.] : Please see my note below. [Consult Closing:] : Thank you very much for allowing me to participate in the care of this patient.  If you have any questions, please do not hesitate to contact me. [Sincerely,] : Sincerely, [FreeTextEntry2] : Dr. Desiree Mckee\par [address] [facility]\[place] [address]\par [phone]\par F: [phone] [FreeTextEntry3] : \par Ritika Roberts MD\par  of Medicine\par Division of Infectious Diseases\par [facility] [employer] [facility]\par [address] [place] [address] [address]\[place] [address]\par Tel: [phone]\par Fax: [phone]

## 2019-10-08 NOTE — PHYSICAL EXAM
[General Appearance - Alert] : alert [General Appearance - In No Acute Distress] : in no acute distress [General Appearance - Well-Appearing] : healthy appearing [FreeTextEntry1] : thin female [Sclera] : the sclera and conjunctiva were normal [PERRL With Normal Accommodation] : pupils were equal in size, round, reactive to light [Outer Ear] : the ears and nose were normal in appearance [Oropharynx] : the oropharynx was normal with no thrush [Neck Appearance] : the appearance of the neck was normal [Neck Cervical Mass (___cm)] : no neck mass was observed [Jugular Venous Distention Increased] : there was no jugular-venous distention [Auscultation Breath Sounds / Voice Sounds] : lungs were clear to auscultation bilaterally [Heart Rate And Rhythm] : heart rate was normal and rhythm regular [Heart Sounds] : normal S1 and S2 [Heart Sounds Gallop] : no gallops [Murmurs] : no murmurs [Heart Sounds Pericardial Friction Rub] : no pericardial rub [Edema] : there was no peripheral edema [Bowel Sounds] : normal bowel sounds [Abdomen Soft] : soft [Abdomen Tenderness] : non-tender [Abdomen Mass (___ Cm)] : no abdominal mass palpated [Supraclavicular Lymph Nodes Enlarged Bilaterally] : supraclavicular [Cervical Lymph Nodes Enlarged Anterior Bilaterally] : anterior cervical [Cervical Lymph Nodes Enlarged Posterior Bilaterally] : posterior cervical [Musculoskeletal - Swelling] : no joint swelling [Motor Tone] : muscle strength and tone were normal [Nail Clubbing] : no clubbing  or cyanosis of the fingernails [Skin Color & Pigmentation] : normal skin color and pigmentation [] : no rash [Skin Lesions] : no skin lesions [No Focal Deficits] : no focal deficits [Oriented To Time, Place, And Person] : oriented to person, place, and time [Affect] : the affect was normal

## 2019-11-01 ENCOUNTER — APPOINTMENT (OUTPATIENT)
Dept: GASTROENTEROLOGY | Facility: CLINIC | Age: 44
End: 2019-11-01

## 2022-01-25 NOTE — ED ADULT NURSE NOTE - PRO INTERPRETER NEED 2
----- Message from Giovani Howell, [address] sent at 1/24/2022  2:20 PM EST -----  Please inform patient that the culture confirmed that she has BV and candida  She is to complete the medication as prescribed  English

## 2022-11-10 ENCOUNTER — LABORATORY RESULT (OUTPATIENT)
Age: 47
End: 2022-11-10

## 2022-11-10 ENCOUNTER — APPOINTMENT (OUTPATIENT)
Dept: GASTROENTEROLOGY | Facility: CLINIC | Age: 47
End: 2022-11-10

## 2022-11-10 VITALS
HEIGHT: 66 IN | OXYGEN SATURATION: 98 % | BODY MASS INDEX: 24.43 KG/M2 | SYSTOLIC BLOOD PRESSURE: 130 MMHG | WEIGHT: 152 LBS | TEMPERATURE: 98.1 F | DIASTOLIC BLOOD PRESSURE: 80 MMHG | RESPIRATION RATE: 14 BRPM | HEART RATE: 72 BPM

## 2022-11-10 PROCEDURE — 99205 OFFICE O/P NEW HI 60 MIN: CPT

## 2022-11-15 RX ORDER — POLYETHYLENE GLYCOL 3350 AND ELECTROLYTES WITH LEMON FLAVOR 236; 22.74; 6.74; 5.86; 2.97 G/4L; G/4L; G/4L; G/4L; G/4L
236 POWDER, FOR SOLUTION ORAL
Qty: 1 | Refills: 0 | Status: ACTIVE | COMMUNITY
Start: 2022-11-15 | End: 1900-01-01

## 2022-11-16 LAB
ALBUMIN SERPL ELPH-MCNC: 5.1 G/DL
ALP BLD-CCNC: 65 U/L
ALT SERPL-CCNC: 22 U/L
ANA SER IF-ACNC: NEGATIVE
ANION GAP SERPL CALC-SCNC: 11 MMOL/L
ARSENIC BLD-MCNC: 5 UG/L
AST SERPL-CCNC: 18 U/L
BARLEY IGE QN: <0.1 KUA/L
BASOPHILS # BLD AUTO: 0.04 K/UL
BASOPHILS NFR BLD AUTO: 0.9 %
BILIRUB SERPL-MCNC: 0.6 MG/DL
BUN SERPL-MCNC: 9 MG/DL
CADMIUM SERPL-MCNC: 0.8 UG/L
CALCIUM SERPL-MCNC: 10 MG/DL
CALPROTECTIN FECAL: <16 UG/G
CHERRY IGE QN: <0.1 KUA/L
CHLORIDE SERPL-SCNC: 102 MMOL/L
CO2 SERPL-SCNC: 28 MMOL/L
COW MILK IGE QN: <0.1 KUA/L
CRAB IGE QN: <0.1 KUA/L
CREAT SERPL-MCNC: 0.63 MG/DL
CRP SERPL-MCNC: <3 MG/L
DEPRECATED BARLEY IGE RAST QL: 0
DEPRECATED CHERRY IGE RAST QL: 0
DEPRECATED COW MILK IGE RAST QL: 0
DEPRECATED CRAB IGE RAST QL: 0
DEPRECATED EGG WHITE IGE RAST QL: 0
DEPRECATED O AND P PREP STL: NORMAL
DEPRECATED OAT IGE RAST QL: 0
DEPRECATED PEANUT IGE RAST QL: 0
DEPRECATED RYE IGE RAST QL: 0
DEPRECATED SOYBEAN IGE RAST QL: 0
DEPRECATED WHEAT IGE RAST QL: 0
EGFR: 109 ML/MIN/1.73M2
EGG WHITE IGE QN: <0.1 KUA/L
EOSINOPHIL # BLD AUTO: 0.06 K/UL
EOSINOPHIL NFR BLD AUTO: 1.3 %
FERRITIN SERPL-MCNC: 46 NG/ML
FOLATE SERPL-MCNC: >20 NG/ML
GLUCOSE SERPL-MCNC: 90 MG/DL
HCT VFR BLD CALC: 39.7 %
HGB BLD-MCNC: 12.7 G/DL
IGA SER QL IEP: 88 MG/DL
IMM GRANULOCYTES NFR BLD AUTO: 0.2 %
IRON SATN MFR SERPL: 40 %
IRON SERPL-MCNC: 143 UG/DL
LEAD BLD-MCNC: <1 UG/DL
LYMPHOCYTES # BLD AUTO: 1.56 K/UL
LYMPHOCYTES NFR BLD AUTO: 34.7 %
MAN DIFF?: NORMAL
MCHC RBC-ENTMCNC: 32 GM/DL
MCHC RBC-ENTMCNC: 32.6 PG
MCV RBC AUTO: 102.1 FL
MERCURY BLD-MCNC: 4.7 UG/L
MONOCYTES # BLD AUTO: 0.28 K/UL
MONOCYTES NFR BLD AUTO: 6.2 %
NEUTROPHILS # BLD AUTO: 2.55 K/UL
NEUTROPHILS NFR BLD AUTO: 56.7 %
OAT IGE QN: <0.1 KUA/L
PEANUT IGE QN: <0.1 KUA/L
PLATELET # BLD AUTO: 235 K/UL
POTASSIUM SERPL-SCNC: 5.1 MMOL/L
PROT SERPL-MCNC: 7.2 G/DL
RBC # BLD: 3.89 M/UL
RBC # FLD: 12.7 %
RYE IGE QN: <0.1 KUA/L
SODIUM SERPL-SCNC: 141 MMOL/L
SOYBEAN IGE QN: <0.1 KUA/L
TIBC SERPL-MCNC: 359 UG/DL
TOTAL IGE SMQN RAST: 31 KU/L
TRYPTASE: 5 UG/L
TSH SERPL-ACNC: 1.91 UIU/ML
TTG IGA SER IA-ACNC: <1.2 U/ML
TTG IGA SER-ACNC: NEGATIVE
TTG IGG SER IA-ACNC: <1.2 U/ML
TTG IGG SER IA-ACNC: NEGATIVE
UIBC SERPL-MCNC: 216 UG/DL
VIT B12 SERPL-MCNC: 598 PG/ML
WBC # FLD AUTO: 4.5 K/UL
WHEAT IGE QN: <0.1 KUA/L

## 2022-11-16 NOTE — ASSESSMENT
[FreeTextEntry1] : - labs, stool tests\par - EGD with SIBO SIFO MAST SUGAR + cscope r/b/a/i discussed and pt amenable\par - avoid nsaids, alcohol, smoking and other gut toxins\par - counseled extensively on diet, lifestyle, gutbrain axis and modulation\par - f/u after above workup\par

## 2022-11-16 NOTE — HISTORY OF PRESENT ILLNESS
[FreeTextEntry1] : hx h pylori and candida overgrowth\par hx antibiotics , last 4 years ago\par hx c/s , one vaginal delivery \par no travelers diarrhea, food poisoning \par FHX- mom- pancreatic cancer, no one else \par #1 CONSTIPATION and incomplete evacuation\par #2 ACID TASTE IN MOUTH - years \par 2018 EGD and CSCOPE for symptoms found H PYLORI and POLYPS\par no ETOH smoking\par diet dairy free gluten free sugar free GRAIN FREE except quinoa and millet\par stress makes her feel worse\par previously took a lot of supplements \par lost weight now regaining\par saw hematologist

## 2022-11-17 ENCOUNTER — NON-APPOINTMENT (OUTPATIENT)
Age: 47
End: 2022-11-17

## 2022-11-18 LAB — PANCREATIC ELASTASE, FECAL: 159 CD:794062645

## 2022-11-21 LAB — H PYLORI AG STL QL: NEGATIVE

## 2022-11-28 ENCOUNTER — RESULT REVIEW (OUTPATIENT)
Age: 47
End: 2022-11-28

## 2022-11-28 ENCOUNTER — APPOINTMENT (OUTPATIENT)
Age: 47
End: 2022-11-28

## 2022-11-28 PROCEDURE — 45378 DIAGNOSTIC COLONOSCOPY: CPT

## 2022-11-28 PROCEDURE — 43235 EGD DIAGNOSTIC BRUSH WASH: CPT

## 2022-12-22 ENCOUNTER — APPOINTMENT (OUTPATIENT)
Dept: GASTROENTEROLOGY | Facility: CLINIC | Age: 47
End: 2022-12-22

## 2022-12-22 DIAGNOSIS — K29.70 GASTRITIS, UNSPECIFIED, W/OUT BLEEDING: ICD-10-CM

## 2022-12-22 DIAGNOSIS — B96.81 GASTRITIS, UNSPECIFIED, W/OUT BLEEDING: ICD-10-CM

## 2022-12-22 PROCEDURE — 99443: CPT

## 2023-01-24 ENCOUNTER — NON-APPOINTMENT (OUTPATIENT)
Age: 48
End: 2023-01-24

## 2023-05-12 DIAGNOSIS — R19.8 OTHER SPECIFIED SYMPTOMS AND SIGNS INVOLVING THE DIGESTIVE SYSTEM AND ABDOMEN: ICD-10-CM

## 2023-05-22 LAB — H PYLORI AG STL QL: NEGATIVE

## 2023-05-24 ENCOUNTER — APPOINTMENT (OUTPATIENT)
Dept: GASTROENTEROLOGY | Facility: CLINIC | Age: 48
End: 2023-05-24
Payer: COMMERCIAL

## 2023-05-24 DIAGNOSIS — K86.81 EXOCRINE PANCREATIC INSUFFICIENCY: ICD-10-CM

## 2023-05-24 PROCEDURE — 99214 OFFICE O/P EST MOD 30 MIN: CPT | Mod: 95

## 2023-06-13 PROBLEM — K86.81 EXOCRINE PANCREATIC INSUFFICIENCY: Status: ACTIVE | Noted: 2023-06-13

## 2023-06-13 NOTE — HISTORY OF PRESENT ILLNESS
[FreeTextEntry1] : 47F with h pylori \par 5/24/23\par - constipated but all symptoms improved otherwise\par - H pylori negative stool test- but was negative at time of breath test \par \par PREVIOUS \par hx h pylori and candida overgrowth\par hx antibiotics , last 4 years ago\par hx c/s , one vaginal delivery \par no travelers diarrhea, food poisoning \par FHX- mom- pancreatic cancer, no one else \par #1 CONSTIPATION and incomplete evacuation\par #2 ACID TASTE IN MOUTH - years \par 2018 EGD and CSCOPE for symptoms found H PYLORI and POLYPS\par no ETOH smoking\par diet dairy free gluten free sugar free GRAIN FREE except quinoa and millet\par stress makes her feel worse\par previously took a lot of supplements \par lost weight now regaining\par saw hematologist

## 2023-06-13 NOTE — ASSESSMENT
[FreeTextEntry1] : 47F with H pylori gastritis, EPI, contsipation likely due to tortuous colon\par \par - ePI- mom with pancreatic cancer, therefore recommend MRI \par - EGD with SIBO SIFO MAST SUGAR shows h pylori gastirits- resolved with confirmation of eradication with stool antigen \par - cscope 2022  [id number] tortuous colon and hemorrhoids, no polyps , FOLLOW UP IN 10 YEARS\par - avoid nsaids, alcohol, smoking and other gut toxins\par - counseled extensively on diet, lifestyle, gutbrain axis and modulation\par - f/u after MRI, pt understands i am leaving practice in june therefore to f/u with me in new practice or with [facility] colleague and to get f/u appt asap so gets seen in a timely fashion, pt understands so does not get lost to follow up\par \par

## 2023-06-13 NOTE — REASON FOR VISIT
[Home] : at home, [unfilled] , at the time of the visit. [Medical Office: ([facility])___] : at the medical office located in  [Patient] : the patient [Self] : self [Follow-up] : a follow-up of an existing diagnosis

## 2023-06-14 LAB — PANCREATIC ELASTASE, FECAL: 134 CD:794062645

## 2023-06-19 ENCOUNTER — NON-APPOINTMENT (OUTPATIENT)
Age: 48
End: 2023-06-19

## 2023-07-06 ENCOUNTER — OUTPATIENT (OUTPATIENT)
Dept: OUTPATIENT SERVICES | Facility: HOSPITAL | Age: 48
LOS: 1 days | End: 2023-07-06
Payer: COMMERCIAL

## 2023-07-06 ENCOUNTER — APPOINTMENT (OUTPATIENT)
Dept: MRI IMAGING | Facility: HOSPITAL | Age: 48
End: 2023-07-06

## 2023-07-06 DIAGNOSIS — Z98.891 HISTORY OF UTERINE SCAR FROM PREVIOUS SURGERY: Chronic | ICD-10-CM

## 2023-07-06 PROCEDURE — 74183 MRI ABD W/O CNTR FLWD CNTR: CPT

## 2023-07-06 PROCEDURE — 74183 MRI ABD W/O CNTR FLWD CNTR: CPT | Mod: 26

## 2023-07-06 PROCEDURE — A9585: CPT

## 2023-07-17 ENCOUNTER — NON-APPOINTMENT (OUTPATIENT)
Age: 48
End: 2023-07-17

## 2023-08-20 ENCOUNTER — TRANSCRIPTION ENCOUNTER (OUTPATIENT)
Age: 48
End: 2023-08-20

## 2023-09-05 ENCOUNTER — TRANSCRIPTION ENCOUNTER (OUTPATIENT)
Age: 48
End: 2023-09-05